# Patient Record
Sex: MALE | Race: WHITE | NOT HISPANIC OR LATINO | URBAN - METROPOLITAN AREA
[De-identification: names, ages, dates, MRNs, and addresses within clinical notes are randomized per-mention and may not be internally consistent; named-entity substitution may affect disease eponyms.]

---

## 2018-05-24 ENCOUNTER — AMBULATORY SURGICAL CENTER (OUTPATIENT)
Dept: URBAN - METROPOLITAN AREA SURGERY 21 | Facility: SURGERY | Age: 68
End: 2018-05-24
Payer: COMMERCIAL

## 2018-05-24 DIAGNOSIS — D12.5 BENIGN NEOPLASM OF SIGMOID COLON: ICD-10-CM

## 2018-05-24 DIAGNOSIS — Z86.010 PERSONAL HISTORY OF COLONIC POLYPS: ICD-10-CM

## 2018-05-24 PROCEDURE — 45380 COLONOSCOPY AND BIOPSY: CPT | Mod: PT

## 2018-06-06 ENCOUNTER — OFFICE (OUTPATIENT)
Dept: URBAN - METROPOLITAN AREA CLINIC 78 | Facility: CLINIC | Age: 68
End: 2018-06-06

## 2018-06-06 VITALS
WEIGHT: 212 LBS | TEMPERATURE: 97.8 F | HEIGHT: 66 IN | DIASTOLIC BLOOD PRESSURE: 81 MMHG | HEART RATE: 65 BPM | SYSTOLIC BLOOD PRESSURE: 137 MMHG

## 2018-06-06 DIAGNOSIS — B18.2 CHRONIC VIRAL HEPATITIS C: ICD-10-CM

## 2018-06-06 PROCEDURE — 99214 OFFICE O/P EST MOD 30 MIN: CPT

## 2018-06-06 NOTE — INTERFACERESULTNOTES
Hep C genotype 2 - eligible for 8 weeks of Mavyret. He does have prior exposure to Hep B so need to do the additional labs during treatment per that protocol. Go ahead with Mavyret script but patient will needs to talk to his PCP about either interrupting his simvastatin or switching to low dose pravastatin during Hep C treatment. He also needs to schedule a f/u OV.

## 2018-06-06 NOTE — SERVICEHPINOTES
68 yo male presents with hepatitis C. This is a new diagnosis based on recent screening test. He does report remote h/o drug use around age 21. Otherwise was unaware of diagnosis. No prior treatment. No prior jaundice or hepatitis symptoms. He denies any current GI complaints. He just had a colonoscopy done by us. No other concerns.4/24/18 plt 264, AST/ALT 32/50, Hep C ab >11, HCV RNA positive, , , HDL 45 FIB-4 1.13

## 2018-06-08 LAB
AMBIG ABBREV CMP14 DEFAULT: (no result)
CBC WITH DIFFERENTIAL/PLATELET: BASO (ABSOLUTE): 0 X10E3/UL (ref 0–0.2)
CBC WITH DIFFERENTIAL/PLATELET: BASOS: 0 %
CBC WITH DIFFERENTIAL/PLATELET: EOS (ABSOLUTE): 0.3 X10E3/UL (ref 0–0.4)
CBC WITH DIFFERENTIAL/PLATELET: EOS: 3 %
CBC WITH DIFFERENTIAL/PLATELET: HEMATOCRIT: 41.1 % (ref 37.5–51)
CBC WITH DIFFERENTIAL/PLATELET: HEMATOLOGY COMMENTS: (no result)
CBC WITH DIFFERENTIAL/PLATELET: HEMOGLOBIN: 13.8 G/DL (ref 13–17.7)
CBC WITH DIFFERENTIAL/PLATELET: IMMATURE CELLS: (no result)
CBC WITH DIFFERENTIAL/PLATELET: IMMATURE GRANS (ABS): 0 X10E3/UL (ref 0–0.1)
CBC WITH DIFFERENTIAL/PLATELET: IMMATURE GRANULOCYTES: 0 %
CBC WITH DIFFERENTIAL/PLATELET: LYMPHS (ABSOLUTE): 4.5 X10E3/UL — HIGH (ref 0.7–3.1)
CBC WITH DIFFERENTIAL/PLATELET: LYMPHS: 43 %
CBC WITH DIFFERENTIAL/PLATELET: MCH: 31.4 PG (ref 26.6–33)
CBC WITH DIFFERENTIAL/PLATELET: MCHC: 33.6 G/DL (ref 31.5–35.7)
CBC WITH DIFFERENTIAL/PLATELET: MCV: 94 FL (ref 79–97)
CBC WITH DIFFERENTIAL/PLATELET: MONOCYTES(ABSOLUTE): 1.4 X10E3/UL — HIGH (ref 0.1–0.9)
CBC WITH DIFFERENTIAL/PLATELET: MONOCYTES: 13 %
CBC WITH DIFFERENTIAL/PLATELET: NEUTROPHILS (ABSOLUTE): 4.4 X10E3/UL (ref 1.4–7)
CBC WITH DIFFERENTIAL/PLATELET: NEUTROPHILS: 41 %
CBC WITH DIFFERENTIAL/PLATELET: NRBC: (no result)
CBC WITH DIFFERENTIAL/PLATELET: PLATELETS: 307 X10E3/UL (ref 150–379)
CBC WITH DIFFERENTIAL/PLATELET: RBC: 4.39 X10E6/UL (ref 4.14–5.8)
CBC WITH DIFFERENTIAL/PLATELET: RDW: 14.9 % (ref 12.3–15.4)
CBC WITH DIFFERENTIAL/PLATELET: WBC: 10.7 X10E3/UL (ref 3.4–10.8)
COMP. METABOLIC PANEL (14): A/G RATIO: 1.7 (ref 1.2–2.2)
COMP. METABOLIC PANEL (14): ALBUMIN: 4.4 G/DL (ref 3.6–4.8)
COMP. METABOLIC PANEL (14): ALKALINE PHOSPHATASE: 50 IU/L (ref 39–117)
COMP. METABOLIC PANEL (14): ALT (SGPT): 72 IU/L — HIGH (ref 0–44)
COMP. METABOLIC PANEL (14): AST (SGOT): 44 IU/L — HIGH (ref 0–40)
COMP. METABOLIC PANEL (14): BILIRUBIN, TOTAL: 0.4 MG/DL (ref 0–1.2)
COMP. METABOLIC PANEL (14): BUN/CREATININE RATIO: 14 (ref 10–24)
COMP. METABOLIC PANEL (14): BUN: 15 MG/DL (ref 8–27)
COMP. METABOLIC PANEL (14): CALCIUM: 9.3 MG/DL (ref 8.6–10.2)
COMP. METABOLIC PANEL (14): CARBON DIOXIDE, TOTAL: 20 MMOL/L (ref 18–29)
COMP. METABOLIC PANEL (14): CHLORIDE: 104 MMOL/L (ref 96–106)
COMP. METABOLIC PANEL (14): CREATININE: 1.08 MG/DL (ref 0.76–1.27)
COMP. METABOLIC PANEL (14): EGFR IF AFRICN AM: 82 ML/MIN/1.73 (ref 59–?)
COMP. METABOLIC PANEL (14): EGFR IF NONAFRICN AM: 71 ML/MIN/1.73 (ref 59–?)
COMP. METABOLIC PANEL (14): GLOBULIN, TOTAL: 2.6 G/DL (ref 1.5–4.5)
COMP. METABOLIC PANEL (14): GLUCOSE: 98 MG/DL (ref 65–99)
COMP. METABOLIC PANEL (14): POTASSIUM: 4.9 MMOL/L (ref 3.5–5.2)
COMP. METABOLIC PANEL (14): PROTEIN, TOTAL: 7 G/DL (ref 6–8.5)
COMP. METABOLIC PANEL (14): SODIUM: 139 MMOL/L (ref 134–144)
HBSAG SCREEN: NEGATIVE
HCV FIBROSURE: ALPHA 2-MACROGLOBULINS, QN: 294 MG/DL — HIGH (ref 110–276)
HCV FIBROSURE: ALT (SGPT) P5P: 78 IU/L — HIGH (ref 0–55)
HCV FIBROSURE: APOLIPOPROTEIN A-1: 156 MG/DL (ref 101–178)
HCV FIBROSURE: BILIRUBIN, TOTAL: 0.2 MG/DL (ref 0–1.2)
HCV FIBROSURE: COMMENT: (no result)
HCV FIBROSURE: FIBROSIS SCORE: 0.26 — HIGH (ref 0–0.21)
HCV FIBROSURE: FIBROSIS SCORING: (no result)
HCV FIBROSURE: FIBROSIS STAGE: (no result)
HCV FIBROSURE: GGT: 42 IU/L (ref 0–65)
HCV FIBROSURE: HAPTOGLOBIN: 246 MG/DL — HIGH (ref 34–200)
HCV FIBROSURE: INTERPRETATIONS: (no result)
HCV FIBROSURE: LIMITATIONS: (no result)
HCV FIBROSURE: NECROINFLAMM ACTIVITY SCORING: (no result)
HCV FIBROSURE: NECROINFLAMMAT ACTIVITY GRADE: (no result)
HCV FIBROSURE: NECROINFLAMMAT ACTIVITY SCORE: 0.45 — HIGH (ref 0–0.17)
HCV GENOTYPING NON REFLEX: HEPATITIS C GENOTYPE: (no result)
HCV GENOTYPING NON REFLEX: PLEASE NOTE: (no result)
HCV RNA (INTERNATIONAL UNITS): (no result) IU/ML
HCV RNA (INTERNATIONAL UNITS): HCV LOG10: 7.05 LOG10 IU/ML
HCV RT-PCR, QUANT (NON-GRAPH): HCV LOG10: (no result)
HCV RT-PCR, QUANT (NON-GRAPH): HEPATITIS C QUANTITATION: (no result) IU/ML
HCV RT-PCR, QUANT (NON-GRAPH): TEST INFORMATION: (no result)
HEP B CORE AB, TOT: POSITIVE
PROTHROMBIN TIME (PT): INR: 1.1 (ref 0.8–1.2)
PROTHROMBIN TIME (PT): PROTHROMBIN TIME: 11.2 SEC (ref 9.1–12)
VITAMIN D, 25-HYDROXY: 32.7 NG/ML (ref 30–100)

## 2018-07-19 ENCOUNTER — OFFICE (OUTPATIENT)
Dept: URBAN - METROPOLITAN AREA CLINIC 78 | Facility: CLINIC | Age: 68
End: 2018-07-19

## 2018-07-19 VITALS
DIASTOLIC BLOOD PRESSURE: 72 MMHG | HEART RATE: 75 BPM | HEIGHT: 66 IN | WEIGHT: 210 LBS | SYSTOLIC BLOOD PRESSURE: 112 MMHG | TEMPERATURE: 98.6 F

## 2018-07-19 DIAGNOSIS — Z20.5 CONTACT WITH AND (SUSPECTED) EXPOSURE TO VIRAL HEPATITIS: ICD-10-CM

## 2018-07-19 DIAGNOSIS — B18.2 CHRONIC VIRAL HEPATITIS C: ICD-10-CM

## 2018-07-19 PROCEDURE — 99214 OFFICE O/P EST MOD 30 MIN: CPT

## 2018-07-19 NOTE — SERVICEHPINOTES
68 yo male presents for f/u hepatitis C. He has GT 2 with Fibrosure predictive of minimal fibrosis. U/S showed fatty liver. He was formerly treatment-naïve but is now on Epclusa (Started late June). Doing well and taking the medication regularly. No use of antacids. He used to drink 2 alcoholic drinks a day and has stopped this for now. No other concerns today. 6/8/18 WBC 10.7, plt 307, Mgllcsada-Y4-8, AST/ALT 44/72, glucose 98, creat 1.08, HCV 11.3 mill IU/ml, INR 1.1, GT 2b, Vit D 32.7, HBsAg neg, HBcAb tot POSBR4/24/18 plt 264, AST/ALT 32/50, Hep C ab >11, HCV RNA positive, , , HDL 45 FIB-4 1.13